# Patient Record
Sex: MALE | URBAN - METROPOLITAN AREA
[De-identification: names, ages, dates, MRNs, and addresses within clinical notes are randomized per-mention and may not be internally consistent; named-entity substitution may affect disease eponyms.]

---

## 2018-01-20 ENCOUNTER — NURSE TRIAGE (OUTPATIENT)
Dept: ADMINISTRATIVE | Facility: CLINIC | Age: 28
End: 2018-01-20

## 2018-01-21 NOTE — TELEPHONE ENCOUNTER
"Caller stated that his tongue is coated white. He just noticed it today. Advised per protocol, provided information to the urgent care for evaluation tomorrow and he verbalized understanding. Instructed to call back with any additional problems and/or concerns.    Reason for Disposition   White patches that stick to tongue or inner cheek    Answer Assessment - Initial Assessment Questions  1. SYMPTOM: "What's the main symptom you're concerned about?" (e.g., dry mouth. chapped lips, lump)      White coating on tongue  2. ONSET: "When did the  ________  start?"      Today  3. PAIN: "Is there any pain?" If so, ask: "How bad is it?" (Scale: 1-10; mild, moderate, severe)      No  4. CAUSE: "What do you think is causing the symptoms?"      Unsure  5. OTHER SYMPTOMS: "Do you have any other symptoms?" (e.g., fever, sore throat, toothache, swelling)      No  6. PREGNANCY: "Is there any chance you are pregnant?" "When was your last menstrual period?"      N/A    Protocols used: ST MOUTH SYMPTOMS-A-AH      "